# Patient Record
Sex: MALE | Race: WHITE
[De-identification: names, ages, dates, MRNs, and addresses within clinical notes are randomized per-mention and may not be internally consistent; named-entity substitution may affect disease eponyms.]

---

## 2020-04-13 ENCOUNTER — HOSPITAL ENCOUNTER (OUTPATIENT)
Dept: HOSPITAL 53 - M RAD | Age: 60
End: 2020-04-13
Attending: PHYSICIAN ASSISTANT
Payer: MEDICARE

## 2020-04-13 DIAGNOSIS — F17.218: Primary | ICD-10-CM

## 2020-04-13 NOTE — REP
REASON:  Tobacco abuse.

 

PRIORS:  None.

 

As per the protocol only lung window images were sent to read station for

interpretation.

 

There is calcified pleural plaquing seen in the right posterior lung field.

There are no abnormal nodules.  There is evidence of early emphysematous change.

 

 

Grossly the mediastinum nd pulmonary estefani are within normal limits.  Grossly the

imaged upper abdomen and imaged osseous structures are within normal limits.

 

IMPRESSION:

 

Category 1S as per the revised Fleischner's Society criteria, no nodules, but

other significant findings that may require followup as described above.

 

 

Electronically Signed by

Ming Mari DO 04/13/2020 11:52 A

## 2021-04-14 ENCOUNTER — HOSPITAL ENCOUNTER (OUTPATIENT)
Dept: HOSPITAL 53 - M RAD | Age: 61
End: 2021-04-14
Attending: PHYSICIAN ASSISTANT
Payer: MEDICARE

## 2021-04-14 DIAGNOSIS — F17.210: Primary | ICD-10-CM

## 2021-04-14 NOTE — REP
INDICATION:

NICOTINE DEPENDENCE.



COMPARISON:

09/04/2018 and 04/13/2020.



TECHNIQUE:

Low dose screening CT chest performed without the use of intravenous contrast.



FINDINGS:

There is a 4 mm nodular opacity on image 52 in the region of the right middle lobe

which has remained stable since 2018.  There is mild bibasilar fibrotic scarring

unchanged.  There are calcified pleural plaques posteriorly on the right indicating

prior asbestos exposure.  There is atherosclerotic calcification of the thoracic aorta

without aneurysmal dilatation.  No mediastinal contour abnormality is seen.  There is

no evidence of pleural or pericardial effusion.  The heart is normal in size.  There

are degenerative changes of the spine.



IMPRESSION:

Category 2 benign low dose noncontrast CT chest.  No suspicious findings.  Chronic

changes as above.  Follow-up recommended in 1 year.





<Electronically signed by Kirit Stack > 04/14/21 0931

## 2022-04-27 ENCOUNTER — HOSPITAL ENCOUNTER (OUTPATIENT)
Dept: HOSPITAL 53 - M RAD | Age: 62
End: 2022-04-27
Attending: PHYSICIAN ASSISTANT
Payer: MEDICARE

## 2022-04-27 DIAGNOSIS — Z87.891: Primary | ICD-10-CM
